# Patient Record
Sex: FEMALE | Race: BLACK OR AFRICAN AMERICAN | NOT HISPANIC OR LATINO | Employment: OTHER | URBAN - METROPOLITAN AREA
[De-identification: names, ages, dates, MRNs, and addresses within clinical notes are randomized per-mention and may not be internally consistent; named-entity substitution may affect disease eponyms.]

---

## 2018-01-30 ENCOUNTER — OFFICE VISIT (OUTPATIENT)
Dept: OBSTETRICS AND GYNECOLOGY | Facility: CLINIC | Age: 38
End: 2018-01-30
Payer: MEDICARE

## 2018-01-30 VITALS
DIASTOLIC BLOOD PRESSURE: 94 MMHG | BODY MASS INDEX: 38.44 KG/M2 | SYSTOLIC BLOOD PRESSURE: 154 MMHG | WEIGHT: 244.94 LBS | HEART RATE: 55 BPM | HEIGHT: 67 IN

## 2018-01-30 DIAGNOSIS — N94.89 OTHER SPECIFIED CONDITIONS ASSOCIATED WITH FEMALE GENITAL ORGANS AND MENSTRUAL CYCLE: ICD-10-CM

## 2018-01-30 DIAGNOSIS — N89.8 VAGINAL DISCHARGE: Primary | ICD-10-CM

## 2018-01-30 DIAGNOSIS — N89.8 VAGINAL ITCHING: ICD-10-CM

## 2018-01-30 DIAGNOSIS — Z11.3 ENCOUNTER FOR SCREENING EXAMINATION FOR SEXUALLY TRANSMITTED DISEASE: ICD-10-CM

## 2018-01-30 PROCEDURE — 99203 OFFICE O/P NEW LOW 30 MIN: CPT | Mod: S$PBB,,, | Performed by: NURSE PRACTITIONER

## 2018-01-30 PROCEDURE — 99203 OFFICE O/P NEW LOW 30 MIN: CPT | Mod: PBBFAC,PO | Performed by: NURSE PRACTITIONER

## 2018-01-30 PROCEDURE — 99999 PR PBB SHADOW E&M-NEW PATIENT-LVL III: CPT | Mod: PBBFAC,,, | Performed by: NURSE PRACTITIONER

## 2018-01-30 PROCEDURE — 87480 CANDIDA DNA DIR PROBE: CPT

## 2018-01-30 PROCEDURE — 87491 CHLMYD TRACH DNA AMP PROBE: CPT

## 2018-01-30 RX ORDER — ENALAPRIL MALEATE 20 MG/1
20 TABLET ORAL DAILY
COMMUNITY
End: 2022-10-10 | Stop reason: DRUGHIGH

## 2018-01-30 NOTE — PROGRESS NOTES
"Chief Complaint   Patient presents with    Vaginal Discharge       History of Present Illness: Quiana Villanueva is a 37 y.o. female that presents today 2018 for   Pt here today c/o thick white vaginal discharge and vaginal itching. She denies any vaginal burning or odor at this time. She reports that the symptoms started about a week ago. She does use scented soaps in the genital area. No other complaints or concerns noted.        Chief Complaint   Patient presents with    Vaginal Discharge         Past Medical History:   Diagnosis Date    Abnormal Pap smear of cervix     Cryosurgery    Hypertension     Pseudotumor cerebri        Past Surgical History:   Procedure Laterality Date    CHOLECYSTECTOMY      GYNECOLOGIC CRYOSURGERY         Current Outpatient Prescriptions   Medication Sig Dispense Refill    enalapril (VASOTEC) 20 MG tablet Take 20 mg by mouth once daily.       No current facility-administered medications for this visit.        Review of patient's allergies indicates:  No Known Allergies    Family History   Problem Relation Age of Onset    Colon cancer Father     Hypertension Mother     Heart disease Mother        Social History   Substance Use Topics    Smoking status: Never Smoker    Smokeless tobacco: Never Used    Alcohol use No       OB History    Para Term  AB Living   4 4 4     4   SAB TAB Ectopic Multiple Live Births           4      # Outcome Date GA Lbr Feliberto/2nd Weight Sex Delivery Anes PTL Lv   4 Term      Vag-Spont   LANDON   3 Term      Vag-Spont   LANDON   2 Term      Vag-Spont   LANDON   1 Term      Vag-Spont   LANDON          Review of Symptoms:  GENERAL: Denies weight gain or weight loss. Feeling well overall.   SKIN: Denies rash or lesions.   ABDOMEN: No abdominal pain, constipation, diarrhea, nausea, vomiting or rectal bleeding.   URINARY: No frequency, dysuria, hematuria, or burning on urination.    BP (!) 154/94   Pulse (!) 55   Ht 5' 7" (1.702 m)   Wt " 111.1 kg (244 lb 14.9 oz)   LMP 01/25/2018 (Exact Date)   Physical Exam:  APPEARANCE: Well nourished, well developed, in no acute distress.  SKIN: Normal skin turgor, no lesions.  RESPIRATORY: Normal respiratory effort with no retractions or use of accessory muscles  PELVIC: Normal external female genitalia without lesions. Normal hair distribution. Vagina moist and well rugated without lesions or discharge. Cervix pink and without lesions.    ASSESSMENT/PLAN:  Vaginal discharge  -     Vaginosis Screen by DNA Probe  -     C. trachomatis/N. gonorrhoeae by AMP DNA Cervix    Encounter for screening examination for sexually transmitted disease  -     Vaginosis Screen by DNA Probe  -     C. trachomatis/N. gonorrhoeae by AMP DNA Cervix    Vaginal itching  -     Vaginosis Screen by DNA Probe  -     C. trachomatis/N. gonorrhoeae by AMP DNA Cervix    Other specified conditions associated with female genital organs and menstrual cycle   -     C. trachomatis/N. gonorrhoeae by AMP DNA Cervix      -Discussed with pt the importance of avoiding scented products in the vaginal area such as soaps, body washes, bubble baths, bath bombs, scented pads/tampons, douches, etc.. Also wear cotton underwear and change out of wet/sweaty clothing as soon as possible. Pt verbalized understanding.     Follow-up:  Will follow-up once results are received  RTC if symptoms worsen or do not improve  RTC as needed

## 2018-01-31 ENCOUNTER — TELEPHONE (OUTPATIENT)
Dept: OBSTETRICS AND GYNECOLOGY | Facility: CLINIC | Age: 38
End: 2018-01-31

## 2018-01-31 LAB
CANDIDA RRNA VAG QL PROBE: NEGATIVE
G VAGINALIS RRNA GENITAL QL PROBE: POSITIVE
T VAGINALIS RRNA GENITAL QL PROBE: NEGATIVE

## 2018-01-31 RX ORDER — METRONIDAZOLE 500 MG/1
500 TABLET ORAL 2 TIMES DAILY
Qty: 14 TABLET | Refills: 0 | Status: SHIPPED | OUTPATIENT
Start: 2018-01-31 | End: 2018-02-07

## 2018-01-31 NOTE — TELEPHONE ENCOUNTER
Please let patient know her vaginosis culture came back positive for bacterial vaginosis- which is not a STD. It is a bacteria that some times over grows in the vagina and replaces normal vaginal claritza. I sent in a prescription for flagyl. Avoid alcohol while taking medication and for 24 hours after last dose.     Thanks

## 2018-02-01 ENCOUNTER — TELEPHONE (OUTPATIENT)
Dept: OBSTETRICS AND GYNECOLOGY | Facility: CLINIC | Age: 38
End: 2018-02-01

## 2018-02-01 LAB
C TRACH DNA SPEC QL NAA+PROBE: NOT DETECTED
N GONORRHOEA DNA SPEC QL NAA+PROBE: NOT DETECTED

## 2019-09-06 ENCOUNTER — HOSPITAL ENCOUNTER (EMERGENCY)
Facility: HOSPITAL | Age: 39
Discharge: HOME | End: 2019-09-06
Attending: EMERGENCY MEDICINE
Payer: MEDICARE

## 2019-09-06 VITALS
DIASTOLIC BLOOD PRESSURE: 87 MMHG | HEIGHT: 67 IN | TEMPERATURE: 98 F | HEART RATE: 46 BPM | OXYGEN SATURATION: 100 % | BODY MASS INDEX: 36.41 KG/M2 | RESPIRATION RATE: 16 BRPM | WEIGHT: 232 LBS | SYSTOLIC BLOOD PRESSURE: 176 MMHG

## 2019-09-06 DIAGNOSIS — R07.9 CHEST PAIN: ICD-10-CM

## 2019-09-06 DIAGNOSIS — M54.9 MUSCULOSKELETAL BACK PAIN: Primary | ICD-10-CM

## 2019-09-06 LAB
ALBUMIN SERPL BCP-MCNC: 3.9 G/DL (ref 3.5–5.2)
ALP SERPL-CCNC: 51 U/L (ref 55–135)
ALT SERPL W/O P-5'-P-CCNC: 10 U/L (ref 10–44)
ANION GAP SERPL CALC-SCNC: 8 MMOL/L (ref 8–16)
AST SERPL-CCNC: 16 U/L (ref 10–40)
B-HCG UR QL: NEGATIVE
BASOPHILS # BLD AUTO: 0.05 K/UL (ref 0–0.2)
BASOPHILS NFR BLD: 1 % (ref 0–1.9)
BILIRUB SERPL-MCNC: 1.1 MG/DL (ref 0.1–1)
BNP SERPL-MCNC: 30 PG/ML (ref 0–99)
BUN SERPL-MCNC: 14 MG/DL (ref 6–20)
CALCIUM SERPL-MCNC: 8.9 MG/DL (ref 8.7–10.5)
CHLORIDE SERPL-SCNC: 105 MMOL/L (ref 95–110)
CO2 SERPL-SCNC: 24 MMOL/L (ref 23–29)
CREAT SERPL-MCNC: 0.7 MG/DL (ref 0.5–1.4)
CTP QC/QA: YES
D DIMER PPP IA.FEU-MCNC: <0.27 UG/ML FEU
DIFFERENTIAL METHOD: NORMAL
EOSINOPHIL # BLD AUTO: 0.1 K/UL (ref 0–0.5)
EOSINOPHIL NFR BLD: 1 % (ref 0–8)
ERYTHROCYTE [DISTWIDTH] IN BLOOD BY AUTOMATED COUNT: 13.2 % (ref 11.5–14.5)
EST. GFR  (AFRICAN AMERICAN): >60 ML/MIN/1.73 M^2
EST. GFR  (NON AFRICAN AMERICAN): >60 ML/MIN/1.73 M^2
GLUCOSE SERPL-MCNC: 95 MG/DL (ref 70–110)
HCT VFR BLD AUTO: 39.2 % (ref 37–48.5)
HGB BLD-MCNC: 12.8 G/DL (ref 12–16)
IMM GRANULOCYTES # BLD AUTO: 0 K/UL (ref 0–0.04)
IMM GRANULOCYTES NFR BLD AUTO: 0 % (ref 0–0.5)
LYMPHOCYTES # BLD AUTO: 2.3 K/UL (ref 1–4.8)
LYMPHOCYTES NFR BLD: 43.4 % (ref 18–48)
MCH RBC QN AUTO: 28 PG (ref 27–31)
MCHC RBC AUTO-ENTMCNC: 32.7 G/DL (ref 32–36)
MCV RBC AUTO: 86 FL (ref 82–98)
MONOCYTES # BLD AUTO: 0.4 K/UL (ref 0.3–1)
MONOCYTES NFR BLD: 8.1 % (ref 4–15)
NEUTROPHILS # BLD AUTO: 2.4 K/UL (ref 1.8–7.7)
NEUTROPHILS NFR BLD: 46.5 % (ref 38–73)
NRBC BLD-RTO: 0 /100 WBC
PLATELET # BLD AUTO: 291 K/UL (ref 150–350)
PMV BLD AUTO: 9.7 FL (ref 9.2–12.9)
POTASSIUM SERPL-SCNC: 3.6 MMOL/L (ref 3.5–5.1)
PROT SERPL-MCNC: 7.4 G/DL (ref 6–8.4)
RBC # BLD AUTO: 4.57 M/UL (ref 4–5.4)
SODIUM SERPL-SCNC: 137 MMOL/L (ref 136–145)
TROPONIN I SERPL DL<=0.01 NG/ML-MCNC: <0.03 NG/ML (ref 0.02–0.04)
WBC # BLD AUTO: 5.19 K/UL (ref 3.9–12.7)

## 2019-09-06 PROCEDURE — 85379 FIBRIN DEGRADATION QUANT: CPT

## 2019-09-06 PROCEDURE — 80053 COMPREHEN METABOLIC PANEL: CPT

## 2019-09-06 PROCEDURE — 81025 URINE PREGNANCY TEST: CPT | Performed by: EMERGENCY MEDICINE

## 2019-09-06 PROCEDURE — 84484 ASSAY OF TROPONIN QUANT: CPT

## 2019-09-06 PROCEDURE — 99285 EMERGENCY DEPT VISIT HI MDM: CPT | Mod: 25

## 2019-09-06 PROCEDURE — 93005 ELECTROCARDIOGRAM TRACING: CPT

## 2019-09-06 PROCEDURE — 83880 ASSAY OF NATRIURETIC PEPTIDE: CPT

## 2019-09-06 PROCEDURE — 25000003 PHARM REV CODE 250: Performed by: EMERGENCY MEDICINE

## 2019-09-06 PROCEDURE — 85025 COMPLETE CBC W/AUTO DIFF WBC: CPT

## 2019-09-06 RX ORDER — METHOCARBAMOL 750 MG/1
750 TABLET, FILM COATED ORAL 3 TIMES DAILY
Qty: 10 TABLET | Refills: 0 | Status: SHIPPED | OUTPATIENT
Start: 2019-09-06 | End: 2019-09-11

## 2019-09-06 RX ORDER — METHOCARBAMOL 500 MG/1
500 TABLET, FILM COATED ORAL
Status: COMPLETED | OUTPATIENT
Start: 2019-09-06 | End: 2019-09-06

## 2019-09-06 RX ADMIN — METHOCARBAMOL 500 MG: 500 TABLET, FILM COATED ORAL at 02:09

## 2019-09-06 NOTE — ED PROVIDER NOTES
Encounter Date: 9/6/2019       History 38-year-old female presents emergency department with complaint of pain to the right scapular region of her back that started this morning while taking a bath patient reports pain increases with breathing, or moving of her torso.     Chief Complaint   Patient presents with    Back Pain     Upper Right     HPI  Review of patient's allergies indicates:  No Known Allergies  Past Medical History:   Diagnosis Date    Abnormal Pap smear of cervix     Cryosurgery    Hypertension     Pseudotumor cerebri      Past Surgical History:   Procedure Laterality Date    CHOLECYSTECTOMY      GYNECOLOGIC CRYOSURGERY       Family History   Problem Relation Age of Onset    Colon cancer Father     Hypertension Mother     Heart disease Mother      Social History     Tobacco Use    Smoking status: Never Smoker    Smokeless tobacco: Never Used   Substance Use Topics    Alcohol use: No    Drug use: No     Review of Systems   Constitutional: Negative.    HENT: Negative.    Eyes: Negative.    Respiratory: Negative.    Cardiovascular: Negative.    Gastrointestinal: Negative.    Genitourinary: Negative.    Musculoskeletal: Negative.    Neurological: Negative.    Hematological: Negative.    Psychiatric/Behavioral: Negative.    All other systems reviewed and are negative.      Physical Exam     Initial Vitals [09/06/19 0950]   BP Pulse Resp Temp SpO2   (!) 170/81 (!) 56 16 98.7 °F (37.1 °C) 100 %      MAP       --         Physical Exam    Nursing note and vitals reviewed.  Constitutional: She appears well-developed and well-nourished.   HENT:   Head: Normocephalic and atraumatic.   Eyes: EOM are normal. Pupils are equal, round, and reactive to light.   Neck: Normal range of motion. Neck supple.   Cardiovascular: Normal rate, regular rhythm, normal heart sounds and intact distal pulses.   Pulmonary/Chest: Breath sounds normal.   Abdominal: Soft. Bowel sounds are normal.   Musculoskeletal:         Right shoulder: She exhibits pain.        Arms:  Patient has pain to the right scapular region that is reproducible with movement of her torso or breathing.   Neurological: She is alert and oriented to person, place, and time. She has normal strength and normal reflexes.   Skin: Skin is warm and dry.   Psychiatric: She has a normal mood and affect.         ED Course   Procedures  Labs Reviewed   CBC W/ AUTO DIFFERENTIAL   COMPREHENSIVE METABOLIC PANEL   TROPONIN I   B-TYPE NATRIURETIC PEPTIDE   D DIMER, QUANTITATIVE     EKG Readings: (Independently Interpreted)   Initial Reading: No STEMI. Rhythm: Sinus Bradycardia. Clinical Impression: with RBBB   NO changes from previous EKG reviewed by DR luis on [facility]   ECG Results          EKG 12-lead (In process)  Result time 09/06/19 10:48:08    In process by Interface, Lab In [facility] (09/06/19 10:48:08)                 Narrative:    Test Reason : R07.9,    Vent. Rate : 049 BPM     Atrial Rate : 049 BPM     P-R Int : 148 ms          QRS Dur : 106 ms      QT Int : 432 ms       P-R-T Axes : 038 000 023 degrees     QTc Int : 390 ms    Sinus bradycardia  Incomplete right bundle branch block  Borderline Abnormal ECG  No previous ECGs available    Referred By: [email or web address]   SELF           Confirmed By:                             Imaging Results    None          Medical Decision Making:   Initial Assessment:   Back pain   Differential Diagnosis:   Musculoskeletal pain, nonspecific chest pain, muscle strain, pulmonary embolism, pneumonia  ED Management:  Two 38-year-old female presents to the emergency room with complaint of nontraumatic pain to the right scapular region that she noted when she was taking a bath and shaving her legs.  Patient states the pain at is felt to most when she lays back on her back or when she moves her torso.  She states sometimes the pain is present when she breathes she has no pain when she is just resting comfortably.  Patient denies associated  shortness of breath or chest pain she does have a history of hypertension and is not compliant with her medications today.  Patient's EKG is essentially unchanged from previous EKG that was noted in a cast and troponin is negative labs are normal D-dimer is negative and patient has no risk factors for pulmonary embolism that would warrant further imaging including CTA at this time.  The patient was given detailed return precautions she will treated at this time for musculoskeletal pain, as the patient's pain is reproducible.         The patient was given detailed return precautions based on my clinical evaluation I do not appreciate any immediate emergent or life-threatening condition or etiology that warrant additional workup today and I feel that the patient could be discharged with close follow-up care           ED Course as of Sep 06 1404   Fri Sep 06, 2019   1355 POCT urine pregnancy [MP]      ED Course User Index  [MP] SHAHBAZ Hughes     Clinical Impression:       ICD-10-CM ICD-9-CM   1. Musculoskeletal back pain M54.9 724.5   2. Chest pain R07.9 786.50                                SHAHBAZ Hughes  09/06/19 1417

## 2019-09-06 NOTE — ED TRIAGE NOTES
Pt states while taking a bath she began having upper right back pain. She says its a burning pain only when she moves. Pain is 7/10.

## 2019-09-06 NOTE — DISCHARGE INSTRUCTIONS
Please follow-up with your primary care provider as directed further evaluation and definitive care  Robaxin as directed if needed for muscle spasms  Return if condition becomes worse he developed chest pain, increasing pain, shortness of breath or any concerns

## 2020-01-17 ENCOUNTER — HOSPITAL ENCOUNTER (EMERGENCY)
Facility: HOSPITAL | Age: 40
Discharge: HOME | End: 2020-01-17
Attending: EMERGENCY MEDICINE
Payer: MEDICARE

## 2020-01-17 VITALS
WEIGHT: 234 LBS | RESPIRATION RATE: 14 BRPM | SYSTOLIC BLOOD PRESSURE: 133 MMHG | DIASTOLIC BLOOD PRESSURE: 74 MMHG | HEIGHT: 67 IN | BODY MASS INDEX: 36.73 KG/M2 | OXYGEN SATURATION: 99 % | TEMPERATURE: 99 F | HEART RATE: 52 BPM

## 2020-01-17 DIAGNOSIS — I10 HYPERTENSION, UNSPECIFIED TYPE: Primary | ICD-10-CM

## 2020-01-17 DIAGNOSIS — I10 HTN (HYPERTENSION): ICD-10-CM

## 2020-01-17 LAB
ALBUMIN SERPL BCP-MCNC: 4.1 G/DL (ref 3.5–5.2)
ALP SERPL-CCNC: 54 U/L (ref 55–135)
ALT SERPL W/O P-5'-P-CCNC: 11 U/L (ref 10–44)
ANION GAP SERPL CALC-SCNC: 9 MMOL/L (ref 8–16)
AST SERPL-CCNC: 18 U/L (ref 10–40)
B-HCG UR QL: NEGATIVE
BASOPHILS # BLD AUTO: 0.05 K/UL (ref 0–0.2)
BASOPHILS NFR BLD: 0.9 % (ref 0–1.9)
BILIRUB SERPL-MCNC: 1.7 MG/DL (ref 0.1–1)
BILIRUB UR QL STRIP: NEGATIVE
BNP SERPL-MCNC: 10 PG/ML (ref 0–99)
BUN SERPL-MCNC: 10 MG/DL (ref 6–20)
CALCIUM SERPL-MCNC: 9.6 MG/DL (ref 8.7–10.5)
CHLORIDE SERPL-SCNC: 106 MMOL/L (ref 95–110)
CLARITY UR: CLEAR
CO2 SERPL-SCNC: 27 MMOL/L (ref 23–29)
COLOR UR: YELLOW
CREAT SERPL-MCNC: 0.8 MG/DL (ref 0.5–1.4)
CTP QC/QA: YES
DIFFERENTIAL METHOD: NORMAL
EOSINOPHIL # BLD AUTO: 0 K/UL (ref 0–0.5)
EOSINOPHIL NFR BLD: 0.7 % (ref 0–8)
ERYTHROCYTE [DISTWIDTH] IN BLOOD BY AUTOMATED COUNT: 12.6 % (ref 11.5–14.5)
EST. GFR  (AFRICAN AMERICAN): >60 ML/MIN/1.73 M^2
EST. GFR  (NON AFRICAN AMERICAN): >60 ML/MIN/1.73 M^2
GLUCOSE SERPL-MCNC: 109 MG/DL (ref 70–110)
GLUCOSE UR QL STRIP: NEGATIVE
HCT VFR BLD AUTO: 40.1 % (ref 37–48.5)
HGB BLD-MCNC: 13.7 G/DL (ref 12–16)
HGB UR QL STRIP: ABNORMAL
IMM GRANULOCYTES # BLD AUTO: 0.01 K/UL (ref 0–0.04)
IMM GRANULOCYTES NFR BLD AUTO: 0.2 % (ref 0–0.5)
INR PPP: 1.1
KETONES UR QL STRIP: NEGATIVE
LEUKOCYTE ESTERASE UR QL STRIP: NEGATIVE
LYMPHOCYTES # BLD AUTO: 2.5 K/UL (ref 1–4.8)
LYMPHOCYTES NFR BLD: 43.5 % (ref 18–48)
MCH RBC QN AUTO: 28.8 PG (ref 27–31)
MCHC RBC AUTO-ENTMCNC: 34.2 G/DL (ref 32–36)
MCV RBC AUTO: 84 FL (ref 82–98)
MONOCYTES # BLD AUTO: 0.4 K/UL (ref 0.3–1)
MONOCYTES NFR BLD: 6.9 % (ref 4–15)
NEUTROPHILS # BLD AUTO: 2.8 K/UL (ref 1.8–7.7)
NEUTROPHILS NFR BLD: 47.8 % (ref 38–73)
NITRITE UR QL STRIP: NEGATIVE
NRBC BLD-RTO: 0 /100 WBC
PH UR STRIP: 6 [PH] (ref 5–8)
PLATELET # BLD AUTO: 299 K/UL (ref 150–350)
PMV BLD AUTO: 9.4 FL (ref 9.2–12.9)
POTASSIUM SERPL-SCNC: 3.7 MMOL/L (ref 3.5–5.1)
PROT SERPL-MCNC: 7.9 G/DL (ref 6–8.4)
PROT UR QL STRIP: NEGATIVE
PROTHROMBIN TIME: 14 SEC (ref 10.6–14.8)
RBC # BLD AUTO: 4.76 M/UL (ref 4–5.4)
SODIUM SERPL-SCNC: 142 MMOL/L (ref 136–145)
SP GR UR STRIP: 1.01 (ref 1–1.03)
TROPONIN I SERPL DL<=0.01 NG/ML-MCNC: <0.03 NG/ML
URN SPEC COLLECT METH UR: ABNORMAL
UROBILINOGEN UR STRIP-ACNC: NEGATIVE EU/DL
WBC # BLD AUTO: 5.79 K/UL (ref 3.9–12.7)

## 2020-01-17 PROCEDURE — 81003 URINALYSIS AUTO W/O SCOPE: CPT

## 2020-01-17 PROCEDURE — 85025 COMPLETE CBC W/AUTO DIFF WBC: CPT

## 2020-01-17 PROCEDURE — 99285 EMERGENCY DEPT VISIT HI MDM: CPT | Mod: 25

## 2020-01-17 PROCEDURE — 83880 ASSAY OF NATRIURETIC PEPTIDE: CPT

## 2020-01-17 PROCEDURE — 81025 URINE PREGNANCY TEST: CPT | Performed by: NURSE PRACTITIONER

## 2020-01-17 PROCEDURE — 85610 PROTHROMBIN TIME: CPT

## 2020-01-17 PROCEDURE — 80053 COMPREHEN METABOLIC PANEL: CPT

## 2020-01-17 PROCEDURE — 93005 ELECTROCARDIOGRAM TRACING: CPT

## 2020-01-17 PROCEDURE — 36415 COLL VENOUS BLD VENIPUNCTURE: CPT

## 2020-01-17 PROCEDURE — 84484 ASSAY OF TROPONIN QUANT: CPT

## 2020-01-17 RX ORDER — HYDROCHLOROTHIAZIDE 25 MG/1
25 TABLET ORAL DAILY
COMMUNITY

## 2020-01-17 RX ORDER — AMLODIPINE BESYLATE 10 MG/1
10 TABLET ORAL DAILY
COMMUNITY

## 2020-01-17 RX ORDER — CLONIDINE HYDROCHLORIDE 0.1 MG/1
0.1 TABLET ORAL EVERY 6 HOURS PRN
Qty: 30 TABLET | Refills: 0 | Status: SHIPPED | OUTPATIENT
Start: 2020-01-17 | End: 2021-01-16

## 2020-01-17 NOTE — DISCHARGE INSTRUCTIONS
Low-sodium diet.  Take clonidine 0.1 mg for systolic blood pressure greater than 160.  Otherwise continue all routine maintenance medications and contact Dr. Renee's office for follow-up appointment.  Low-sodium diet

## 2020-07-02 ENCOUNTER — HOSPITAL ENCOUNTER (OUTPATIENT)
Dept: RADIOLOGY | Facility: HOSPITAL | Age: 40
Discharge: HOME | End: 2020-07-02
Attending: EMERGENCY MEDICINE
Payer: MEDICARE

## 2020-07-02 DIAGNOSIS — R06.02 SHORTNESS OF BREATH: ICD-10-CM

## 2020-07-02 PROCEDURE — 71046 X-RAY EXAM CHEST 2 VIEWS: CPT | Mod: TC,PO

## 2020-07-10 ENCOUNTER — HOSPITAL ENCOUNTER (EMERGENCY)
Facility: HOSPITAL | Age: 40
Discharge: HOME | End: 2020-07-10
Attending: EMERGENCY MEDICINE
Payer: MEDICARE

## 2020-07-10 VITALS
BODY MASS INDEX: 36.1 KG/M2 | RESPIRATION RATE: 20 BRPM | OXYGEN SATURATION: 100 % | DIASTOLIC BLOOD PRESSURE: 75 MMHG | WEIGHT: 230 LBS | HEIGHT: 67 IN | SYSTOLIC BLOOD PRESSURE: 121 MMHG | TEMPERATURE: 99 F | HEART RATE: 71 BPM

## 2020-07-10 DIAGNOSIS — E83.42 HYPOMAGNESEMIA: ICD-10-CM

## 2020-07-10 DIAGNOSIS — R20.0 NUMBNESS: Primary | ICD-10-CM

## 2020-07-10 DIAGNOSIS — E87.6 HYPOKALEMIA: ICD-10-CM

## 2020-07-10 LAB
ALBUMIN SERPL BCP-MCNC: 3.6 G/DL (ref 3.5–5.2)
ALP SERPL-CCNC: 54 U/L (ref 55–135)
ALT SERPL W/O P-5'-P-CCNC: 12 U/L (ref 10–44)
ANION GAP SERPL CALC-SCNC: 9 MMOL/L (ref 8–16)
AST SERPL-CCNC: 17 U/L (ref 10–40)
B-HCG UR QL: NEGATIVE
BASOPHILS # BLD AUTO: 0.04 K/UL (ref 0–0.2)
BASOPHILS NFR BLD: 0.7 % (ref 0–1.9)
BILIRUB SERPL-MCNC: 0.5 MG/DL (ref 0.1–1)
BUN SERPL-MCNC: 16 MG/DL (ref 6–20)
CALCIUM SERPL-MCNC: 9 MG/DL (ref 8.7–10.5)
CHLORIDE SERPL-SCNC: 103 MMOL/L (ref 95–110)
CO2 SERPL-SCNC: 27 MMOL/L (ref 23–29)
CREAT SERPL-MCNC: 0.7 MG/DL (ref 0.5–1.4)
CTP QC/QA: YES
DIFFERENTIAL METHOD: ABNORMAL
EOSINOPHIL # BLD AUTO: 0.1 K/UL (ref 0–0.5)
EOSINOPHIL NFR BLD: 1 % (ref 0–8)
ERYTHROCYTE [DISTWIDTH] IN BLOOD BY AUTOMATED COUNT: 13 % (ref 11.5–14.5)
EST. GFR  (AFRICAN AMERICAN): >60 ML/MIN/1.73 M^2
EST. GFR  (NON AFRICAN AMERICAN): >60 ML/MIN/1.73 M^2
GLUCOSE SERPL-MCNC: 149 MG/DL (ref 70–110)
HCT VFR BLD AUTO: 33.8 % (ref 37–48.5)
HGB BLD-MCNC: 11.5 G/DL (ref 12–16)
IMM GRANULOCYTES # BLD AUTO: 0.01 K/UL (ref 0–0.04)
IMM GRANULOCYTES NFR BLD AUTO: 0.2 % (ref 0–0.5)
INR PPP: 1
LYMPHOCYTES # BLD AUTO: 2.4 K/UL (ref 1–4.8)
LYMPHOCYTES NFR BLD: 42.1 % (ref 18–48)
MAGNESIUM SERPL-MCNC: 1.5 MG/DL (ref 1.6–2.6)
MCH RBC QN AUTO: 28.3 PG (ref 27–31)
MCHC RBC AUTO-ENTMCNC: 34 G/DL (ref 32–36)
MCV RBC AUTO: 83 FL (ref 82–98)
MONOCYTES # BLD AUTO: 0.5 K/UL (ref 0.3–1)
MONOCYTES NFR BLD: 9.3 % (ref 4–15)
NEUTROPHILS # BLD AUTO: 2.7 K/UL (ref 1.8–7.7)
NEUTROPHILS NFR BLD: 46.7 % (ref 38–73)
NRBC BLD-RTO: 0 /100 WBC
PLATELET # BLD AUTO: 264 K/UL (ref 150–350)
PMV BLD AUTO: 9.8 FL (ref 9.2–12.9)
POTASSIUM SERPL-SCNC: 3 MMOL/L (ref 3.5–5.1)
PROT SERPL-MCNC: 6.7 G/DL (ref 6–8.4)
PROTHROMBIN TIME: 13.1 SEC (ref 10.6–14.8)
RBC # BLD AUTO: 4.06 M/UL (ref 4–5.4)
SODIUM SERPL-SCNC: 139 MMOL/L (ref 136–145)
TROPONIN I SERPL DL<=0.01 NG/ML-MCNC: <0.03 NG/ML
TSH SERPL DL<=0.005 MIU/L-ACNC: 1.27 UIU/ML (ref 0.34–5.6)
WBC # BLD AUTO: 5.8 K/UL (ref 3.9–12.7)

## 2020-07-10 PROCEDURE — 25000003 PHARM REV CODE 250: Performed by: EMERGENCY MEDICINE

## 2020-07-10 PROCEDURE — 93005 ELECTROCARDIOGRAM TRACING: CPT | Performed by: INTERNAL MEDICINE

## 2020-07-10 PROCEDURE — 83735 ASSAY OF MAGNESIUM: CPT

## 2020-07-10 PROCEDURE — 25500020 PHARM REV CODE 255: Performed by: EMERGENCY MEDICINE

## 2020-07-10 PROCEDURE — 80053 COMPREHEN METABOLIC PANEL: CPT

## 2020-07-10 PROCEDURE — 99285 EMERGENCY DEPT VISIT HI MDM: CPT | Mod: 25

## 2020-07-10 PROCEDURE — 96365 THER/PROPH/DIAG IV INF INIT: CPT

## 2020-07-10 PROCEDURE — 85610 PROTHROMBIN TIME: CPT

## 2020-07-10 PROCEDURE — 63600175 PHARM REV CODE 636 W HCPCS: Performed by: EMERGENCY MEDICINE

## 2020-07-10 PROCEDURE — 84484 ASSAY OF TROPONIN QUANT: CPT

## 2020-07-10 PROCEDURE — 84443 ASSAY THYROID STIM HORMONE: CPT

## 2020-07-10 PROCEDURE — 36415 COLL VENOUS BLD VENIPUNCTURE: CPT

## 2020-07-10 PROCEDURE — 85025 COMPLETE CBC W/AUTO DIFF WBC: CPT

## 2020-07-10 PROCEDURE — 81025 URINE PREGNANCY TEST: CPT | Performed by: EMERGENCY MEDICINE

## 2020-07-10 RX ORDER — MAGNESIUM SULFATE HEPTAHYDRATE 40 MG/ML
2 INJECTION, SOLUTION INTRAVENOUS ONCE
Status: COMPLETED | OUTPATIENT
Start: 2020-07-10 | End: 2020-07-10

## 2020-07-10 RX ORDER — POTASSIUM CHLORIDE 20 MEQ/1
40 TABLET, EXTENDED RELEASE ORAL ONCE
Status: COMPLETED | OUTPATIENT
Start: 2020-07-10 | End: 2020-07-10

## 2020-07-10 RX ADMIN — IOHEXOL 100 ML: 350 INJECTION, SOLUTION INTRAVENOUS at 07:07

## 2020-07-10 RX ADMIN — POTASSIUM CHLORIDE 40 MEQ: 20 TABLET, EXTENDED RELEASE ORAL at 07:07

## 2020-07-10 RX ADMIN — MAGNESIUM SULFATE IN WATER 2 G: 40 INJECTION, SOLUTION INTRAVENOUS at 07:07

## 2020-07-11 NOTE — DISCHARGE INSTRUCTIONS
Follow-up ultrasound of the thyroid is indicated at is it is enlarged on CT scan.  Follow this up with your primary care doctor.  Return to the ER if any worsening numbness tingling weakness or any concern.

## 2020-07-11 NOTE — ED NOTES
At 2115- Discharge instructions, diagnosis, medications, and follow up discussed with patient. Patient verbalized understanding. All questions and concerns answered. No needs expressed at the time. Pt is awake, alert and oriented with no acute distress noted. Respirations even and unlabored. Ambulatory out of ED with mask in place.

## 2020-07-11 NOTE — ED PROVIDER NOTES
Encounter Date: 7/10/2020       History     Chief Complaint   Patient presents with    Numbness     both arms/hands happened last Thursday and again at 4 PM today     Patient presents complaining of tingling and numbness to both hands, right thigh tingling and numbness, palpitations, tingling and numbness around the lips.  Symptoms have been present since 11:00 a.m..  Patient states she had a similar episode about 1 week ago.  At that time went to another hospital had a negative head CT ultimately was discharged.  She denies any change in speech or weakness.        Review of patient's allergies indicates:  No Known Allergies  Past Medical History:   Diagnosis Date    Abnormal Pap smear of cervix     Cryosurgery    Hypertension     Pseudotumor cerebri      Past Surgical History:   Procedure Laterality Date    CHOLECYSTECTOMY      GYNECOLOGIC CRYOSURGERY       Family History   Problem Relation Age of Onset    Colon cancer Father     Hypertension Mother     Heart disease Mother      Social History     Tobacco Use    Smoking status: Never Smoker    Smokeless tobacco: Never Used   Substance Use Topics    Alcohol use: No    Drug use: No     Review of Systems   Neurological: Positive for numbness. Negative for speech difficulty and weakness.   All other systems reviewed and are negative.      Physical Exam     Initial Vitals [07/10/20 1657]   BP Pulse Resp Temp SpO2   (!) 144/79 85 20 98.5 °F (36.9 °C) 100 %      MAP       --         Physical Exam    Nursing note and vitals reviewed.  Constitutional: She appears well-developed and well-nourished.   HENT:   Head: Normocephalic and atraumatic.   Mouth/Throat: Oropharynx is clear and moist.   Eyes: EOM are normal.   Neck: Normal range of motion. Neck supple.   Cardiovascular: Normal rate, regular rhythm, normal heart sounds and intact distal pulses.   Pulmonary/Chest: Breath sounds normal. No respiratory distress.   Abdominal: Soft.   Musculoskeletal: Normal  range of motion.   Neurological: She is alert and oriented to person, place, and time. She has normal strength.   Vision - Normal  Aphasia - Normal  Neglect - Normal  Pronator drift - Normal  Cerebellum - Normal  RUE strength - Normal  RLE strength - Normal  LUE strength - Normal  LLE strength - Normal   Skin: Skin is warm and dry. Capillary refill takes less than 2 seconds.   Psychiatric: She has a normal mood and affect. Her behavior is normal. Judgment and thought content normal.         ED Course   Procedures  Labs Reviewed   COMPREHENSIVE METABOLIC PANEL - Abnormal; Notable for the following components:       Result Value    Potassium 3.0 (*)     Glucose 149 (*)     Alkaline Phosphatase 54 (*)     All other components within normal limits   MAGNESIUM - Abnormal; Notable for the following components:    Magnesium 1.5 (*)     All other components within normal limits   CBC W/ AUTO DIFFERENTIAL - Abnormal; Notable for the following components:    Hemoglobin 11.5 (*)     Hematocrit 33.8 (*)     All other components within normal limits   PROTIME-INR   TROPONIN I   POCT URINE PREGNANCY     EKG Readings: (Independently Interpreted)   EKG is normal sinus rhythm, no acute ST changes, regular rate       Imaging Results          CTA Head and Neck (xpd) (In process)                CT Head Without Contrast (In process)                X-ray Chest AP Portable (Final result)  Result time 07/10/20 17:24:17    Final result by Brett Higginbotham MD (07/10/20 17:24:17)                 Narrative:    Reason: numbness Numbness?(both arms/hands happened last Thursday and  again at 4 PM today)    FINDINGS:  Portable chest at 1750 compared with 7/2/2020 shows normal  cardiomediastinal silhouette.    Lungs are clear. Pulmonary vasculature is normal. No acute osseous  abnormality.    IMPRESSION:  Negative chest.    Electronically Signed by Brett Higginbotham M.D. on 7/10/2020 5:29 PM                               Medical Decision Making:   ED  Management:  I suspect patient's numbness and tingling or secondary to electrolyte abnormalities as her potassium and magnesium were both markedly low the emergency department.  Patient received an IV magnesium replacement as well as oral potassium in the ER.  I have low suspicion for stroke as patient's numbness is bilateral to both hands and she has no one-sided weakness or any focal deficits.  There is no change in speech.  Her neuro exam is normal.  I advised patient that she likely will need to supplement potassium in her diet daily and that she will need primary care follow-up.  I am awaiting imaging of her head and CTA in the abundance of caution.  I anticipate these are normal patient can be safely discharged.    I advised patient of findings of enlarged thyroid and need for ultrasound.  Also added on TSH which she will follow up with her primary care doctor.                                 Clinical Impression:       ICD-10-CM ICD-9-CM   1. Numbness  R20.0 782.0   2. Hypomagnesemia  E83.42 275.2   3. Hypokalemia  E87.6 276.8                                Emil Reyes MD  07/10/20 2130       Emil Reyes MD  07/10/20 2156

## 2020-07-11 NOTE — ED NOTES
At 1900- Report received from BAILEE Hernandez. Assume care of pt. Pt resting comfortably and independently repositioned in stretcher with bed locked in lowest position for safety. NAD noted at this time. Respirations even and unlabored and visible chest rise noted.   Pt instructed to call if assistance is needed. Pt updated on POC and verbalized understanding. Call light within reach. Family at bedside. No needs at this time. Will continue to monitor.

## 2020-09-11 ENCOUNTER — HOSPITAL ENCOUNTER (OUTPATIENT)
Dept: RADIOLOGY | Facility: HOSPITAL | Age: 40
Discharge: HOME | End: 2020-09-11
Attending: EMERGENCY MEDICINE
Payer: MEDICARE

## 2020-09-11 DIAGNOSIS — Z86.73 PERSONAL HISTORY OF TRANSIENT ISCHEMIC ATTACK (TIA), AND CEREBRAL INFARCTION WITHOUT RESIDUAL DEFICITS: ICD-10-CM

## 2020-09-11 DIAGNOSIS — Z86.73 PERSONAL HISTORY OF TRANSIENT ISCHEMIC ATTACK (TIA), AND CEREBRAL INFARCTION WITHOUT RESIDUAL DEFICITS: Primary | ICD-10-CM

## 2020-09-11 PROCEDURE — 70551 MRI BRAIN STEM W/O DYE: CPT | Mod: TC,PO

## 2021-05-10 ENCOUNTER — PATIENT MESSAGE (OUTPATIENT)
Dept: RESEARCH | Facility: HOSPITAL | Age: 41
End: 2021-05-10

## 2021-08-19 DIAGNOSIS — Z12.31 ENCOUNTER FOR SCREENING MAMMOGRAM FOR MALIGNANT NEOPLASM OF BREAST: Primary | ICD-10-CM

## 2021-08-27 ENCOUNTER — HOSPITAL ENCOUNTER (OUTPATIENT)
Dept: RADIOLOGY | Facility: HOSPITAL | Age: 41
Discharge: HOME | End: 2021-08-27
Attending: OBSTETRICS & GYNECOLOGY
Payer: MEDICARE

## 2021-08-27 DIAGNOSIS — Z12.31 ENCOUNTER FOR SCREENING MAMMOGRAM FOR MALIGNANT NEOPLASM OF BREAST: ICD-10-CM

## 2021-08-27 PROCEDURE — 77067 SCR MAMMO BI INCL CAD: CPT | Mod: TC,PO

## 2021-09-24 ENCOUNTER — HOSPITAL ENCOUNTER (EMERGENCY)
Facility: HOSPITAL | Age: 41
Discharge: HOME | End: 2021-09-24
Attending: EMERGENCY MEDICINE
Payer: MEDICARE

## 2021-09-24 VITALS
WEIGHT: 243 LBS | RESPIRATION RATE: 18 BRPM | SYSTOLIC BLOOD PRESSURE: 162 MMHG | DIASTOLIC BLOOD PRESSURE: 76 MMHG | OXYGEN SATURATION: 99 % | HEART RATE: 50 BPM | HEIGHT: 67 IN | TEMPERATURE: 98 F | BODY MASS INDEX: 38.14 KG/M2

## 2021-09-24 DIAGNOSIS — R51.9 NONINTRACTABLE HEADACHE, UNSPECIFIED CHRONICITY PATTERN, UNSPECIFIED HEADACHE TYPE: Primary | ICD-10-CM

## 2021-09-24 DIAGNOSIS — I10 HYPERTENSION, UNSPECIFIED TYPE: ICD-10-CM

## 2021-09-24 LAB
ALBUMIN SERPL BCP-MCNC: 3.7 G/DL (ref 3.5–5.2)
ALP SERPL-CCNC: 52 U/L (ref 55–135)
ALT SERPL W/O P-5'-P-CCNC: 10 U/L (ref 10–44)
ANION GAP SERPL CALC-SCNC: 11 MMOL/L (ref 8–16)
AST SERPL-CCNC: 17 U/L (ref 10–40)
BASOPHILS # BLD AUTO: 0.03 K/UL (ref 0–0.2)
BASOPHILS NFR BLD: 0.5 % (ref 0–1.9)
BILIRUB SERPL-MCNC: 0.6 MG/DL (ref 0.1–1)
BUN SERPL-MCNC: 9 MG/DL (ref 6–20)
CALCIUM SERPL-MCNC: 8.6 MG/DL (ref 8.7–10.5)
CHLORIDE SERPL-SCNC: 103 MMOL/L (ref 95–110)
CO2 SERPL-SCNC: 25 MMOL/L (ref 23–29)
CREAT SERPL-MCNC: 0.6 MG/DL (ref 0.5–1.4)
DIFFERENTIAL METHOD: ABNORMAL
EOSINOPHIL # BLD AUTO: 0.1 K/UL (ref 0–0.5)
EOSINOPHIL NFR BLD: 1.6 % (ref 0–8)
ERYTHROCYTE [DISTWIDTH] IN BLOOD BY AUTOMATED COUNT: 13.4 % (ref 11.5–14.5)
EST. GFR  (AFRICAN AMERICAN): >60 ML/MIN/1.73 M^2
EST. GFR  (NON AFRICAN AMERICAN): >60 ML/MIN/1.73 M^2
GLUCOSE SERPL-MCNC: 100 MG/DL (ref 70–110)
HCT VFR BLD AUTO: 34.6 % (ref 37–48.5)
HGB BLD-MCNC: 11.3 G/DL (ref 12–16)
IMM GRANULOCYTES # BLD AUTO: 0.01 K/UL (ref 0–0.04)
IMM GRANULOCYTES NFR BLD AUTO: 0.2 % (ref 0–0.5)
INR PPP: 1.1
LYMPHOCYTES # BLD AUTO: 2.2 K/UL (ref 1–4.8)
LYMPHOCYTES NFR BLD: 38.7 % (ref 18–48)
MCH RBC QN AUTO: 27.4 PG (ref 27–31)
MCHC RBC AUTO-ENTMCNC: 32.7 G/DL (ref 32–36)
MCV RBC AUTO: 84 FL (ref 82–98)
MONOCYTES # BLD AUTO: 0.6 K/UL (ref 0.3–1)
MONOCYTES NFR BLD: 11.1 % (ref 4–15)
NEUTROPHILS # BLD AUTO: 2.7 K/UL (ref 1.8–7.7)
NEUTROPHILS NFR BLD: 47.9 % (ref 38–73)
NRBC BLD-RTO: 0 /100 WBC
PLATELET # BLD AUTO: 309 K/UL (ref 150–450)
PMV BLD AUTO: 10 FL (ref 9.2–12.9)
POTASSIUM SERPL-SCNC: 3.6 MMOL/L (ref 3.5–5.1)
PROT SERPL-MCNC: 7.2 G/DL (ref 6–8.4)
PROTHROMBIN TIME: 13.4 SEC (ref 11.8–14.3)
RBC # BLD AUTO: 4.12 M/UL (ref 4–5.4)
SODIUM SERPL-SCNC: 139 MMOL/L (ref 136–145)
WBC # BLD AUTO: 5.66 K/UL (ref 3.9–12.7)

## 2021-09-24 PROCEDURE — 85610 PROTHROMBIN TIME: CPT | Performed by: EMERGENCY MEDICINE

## 2021-09-24 PROCEDURE — 80053 COMPREHEN METABOLIC PANEL: CPT | Performed by: EMERGENCY MEDICINE

## 2021-09-24 PROCEDURE — 93005 ELECTROCARDIOGRAM TRACING: CPT | Performed by: INTERNAL MEDICINE

## 2021-09-24 PROCEDURE — 93010 EKG 12-LEAD: ICD-10-PCS | Mod: ,,, | Performed by: INTERNAL MEDICINE

## 2021-09-24 PROCEDURE — 85025 COMPLETE CBC W/AUTO DIFF WBC: CPT | Performed by: EMERGENCY MEDICINE

## 2021-09-24 PROCEDURE — 93010 ELECTROCARDIOGRAM REPORT: CPT | Mod: ,,, | Performed by: INTERNAL MEDICINE

## 2021-09-24 PROCEDURE — 25000003 PHARM REV CODE 250: Performed by: EMERGENCY MEDICINE

## 2021-09-24 PROCEDURE — 99284 EMERGENCY DEPT VISIT MOD MDM: CPT | Mod: 25

## 2021-09-24 RX ORDER — CLONIDINE HYDROCHLORIDE 0.1 MG/1
0.1 TABLET ORAL
Status: DISCONTINUED | OUTPATIENT
Start: 2021-09-24 | End: 2021-09-24

## 2021-09-24 RX ORDER — ALPRAZOLAM 0.5 MG/1
0.5 TABLET ORAL 3 TIMES DAILY
COMMUNITY

## 2021-09-24 RX ORDER — BUTALBITAL, ACETAMINOPHEN AND CAFFEINE 50; 325; 40 MG/1; MG/1; MG/1
1 TABLET ORAL EVERY 4 HOURS PRN
Qty: 12 TABLET | Refills: 0 | Status: SHIPPED | OUTPATIENT
Start: 2021-09-24 | End: 2021-10-24

## 2021-09-24 RX ORDER — BUTALBITAL, ACETAMINOPHEN AND CAFFEINE 50; 325; 40 MG/1; MG/1; MG/1
1 TABLET ORAL EVERY 4 HOURS PRN
Status: DISCONTINUED | OUTPATIENT
Start: 2021-09-24 | End: 2021-09-24 | Stop reason: HOSPADM

## 2021-09-24 RX ADMIN — BUTALBITAL, ACETAMINOPHEN AND CAFFEINE 1 TABLET: 50; 325; 40 TABLET ORAL at 12:09

## 2022-10-10 ENCOUNTER — HOSPITAL ENCOUNTER (EMERGENCY)
Facility: HOSPITAL | Age: 42
Discharge: HOME | End: 2022-10-10
Attending: EMERGENCY MEDICINE
Payer: MEDICARE

## 2022-10-10 VITALS
DIASTOLIC BLOOD PRESSURE: 82 MMHG | HEIGHT: 67 IN | WEIGHT: 248 LBS | BODY MASS INDEX: 38.92 KG/M2 | RESPIRATION RATE: 18 BRPM | SYSTOLIC BLOOD PRESSURE: 150 MMHG | OXYGEN SATURATION: 99 % | TEMPERATURE: 98 F | HEART RATE: 55 BPM

## 2022-10-10 DIAGNOSIS — T78.3XXA ANGIOEDEMA, INITIAL ENCOUNTER: Primary | ICD-10-CM

## 2022-10-10 LAB
ALBUMIN SERPL BCP-MCNC: 3.5 G/DL (ref 3.5–5.2)
ALP SERPL-CCNC: 66 U/L (ref 55–135)
ALT SERPL W/O P-5'-P-CCNC: 9 U/L (ref 10–44)
ANION GAP SERPL CALC-SCNC: 9 MMOL/L (ref 8–16)
AST SERPL-CCNC: 15 U/L (ref 10–40)
BASOPHILS # BLD AUTO: 0.03 K/UL (ref 0–0.2)
BASOPHILS NFR BLD: 0.3 % (ref 0–1.9)
BILIRUB SERPL-MCNC: 0.4 MG/DL (ref 0.1–1)
BUN SERPL-MCNC: 8 MG/DL (ref 6–20)
CALCIUM SERPL-MCNC: 8.9 MG/DL (ref 8.7–10.5)
CHLORIDE SERPL-SCNC: 103 MMOL/L (ref 95–110)
CO2 SERPL-SCNC: 25 MMOL/L (ref 23–29)
CREAT SERPL-MCNC: 0.7 MG/DL (ref 0.5–1.4)
DIFFERENTIAL METHOD: ABNORMAL
EOSINOPHIL # BLD AUTO: 0.1 K/UL (ref 0–0.5)
EOSINOPHIL NFR BLD: 0.9 % (ref 0–8)
ERYTHROCYTE [DISTWIDTH] IN BLOOD BY AUTOMATED COUNT: 18.4 % (ref 11.5–14.5)
EST. GFR  (NO RACE VARIABLE): >60 ML/MIN/1.73 M^2
GLUCOSE SERPL-MCNC: 249 MG/DL (ref 70–110)
HCT VFR BLD AUTO: 34.4 % (ref 37–48.5)
HGB BLD-MCNC: 10.6 G/DL (ref 12–16)
IMM GRANULOCYTES # BLD AUTO: 0.03 K/UL (ref 0–0.04)
IMM GRANULOCYTES NFR BLD AUTO: 0.3 % (ref 0–0.5)
LYMPHOCYTES # BLD AUTO: 5.1 K/UL (ref 1–4.8)
LYMPHOCYTES NFR BLD: 52.7 % (ref 18–48)
MCH RBC QN AUTO: 23.5 PG (ref 27–31)
MCHC RBC AUTO-ENTMCNC: 30.8 G/DL (ref 32–36)
MCV RBC AUTO: 76 FL (ref 82–98)
MONOCYTES # BLD AUTO: 0.6 K/UL (ref 0.3–1)
MONOCYTES NFR BLD: 6.5 % (ref 4–15)
NEUTROPHILS # BLD AUTO: 3.8 K/UL (ref 1.8–7.7)
NEUTROPHILS NFR BLD: 39.3 % (ref 38–73)
NRBC BLD-RTO: 0 /100 WBC
PLATELET # BLD AUTO: 354 K/UL (ref 150–450)
PMV BLD AUTO: 9.5 FL (ref 9.2–12.9)
POTASSIUM SERPL-SCNC: 2.8 MMOL/L (ref 3.5–5.1)
PROT SERPL-MCNC: 7 G/DL (ref 6–8.4)
RBC # BLD AUTO: 4.51 M/UL (ref 4–5.4)
SODIUM SERPL-SCNC: 137 MMOL/L (ref 136–145)
WBC # BLD AUTO: 9.67 K/UL (ref 3.9–12.7)

## 2022-10-10 PROCEDURE — 96372 THER/PROPH/DIAG INJ SC/IM: CPT | Performed by: EMERGENCY MEDICINE

## 2022-10-10 PROCEDURE — 99284 EMERGENCY DEPT VISIT MOD MDM: CPT | Mod: 25

## 2022-10-10 PROCEDURE — 85025 COMPLETE CBC W/AUTO DIFF WBC: CPT | Performed by: EMERGENCY MEDICINE

## 2022-10-10 PROCEDURE — 80053 COMPREHEN METABOLIC PANEL: CPT | Performed by: EMERGENCY MEDICINE

## 2022-10-10 PROCEDURE — 96374 THER/PROPH/DIAG INJ IV PUSH: CPT

## 2022-10-10 PROCEDURE — 63600175 PHARM REV CODE 636 W HCPCS: Performed by: EMERGENCY MEDICINE

## 2022-10-10 PROCEDURE — 25000003 PHARM REV CODE 250: Performed by: EMERGENCY MEDICINE

## 2022-10-10 PROCEDURE — 96375 TX/PRO/DX INJ NEW DRUG ADDON: CPT

## 2022-10-10 RX ORDER — PREDNISONE 20 MG/1
40 TABLET ORAL DAILY
Qty: 10 TABLET | Refills: 0 | Status: SHIPPED | OUTPATIENT
Start: 2022-10-10 | End: 2022-10-13

## 2022-10-10 RX ORDER — METHYLPREDNISOLONE SOD SUCC 125 MG
125 VIAL (EA) INJECTION
Status: COMPLETED | OUTPATIENT
Start: 2022-10-10 | End: 2022-10-10

## 2022-10-10 RX ORDER — FAMOTIDINE 10 MG/ML
20 INJECTION INTRAVENOUS
Status: COMPLETED | OUTPATIENT
Start: 2022-10-10 | End: 2022-10-10

## 2022-10-10 RX ORDER — EPINEPHRINE 0.3 MG/.3ML
0.3 INJECTION SUBCUTANEOUS
Status: COMPLETED | OUTPATIENT
Start: 2022-10-10 | End: 2022-10-10

## 2022-10-10 RX ORDER — DIPHENHYDRAMINE HYDROCHLORIDE 50 MG/ML
25 INJECTION INTRAMUSCULAR; INTRAVENOUS
Status: COMPLETED | OUTPATIENT
Start: 2022-10-10 | End: 2022-10-10

## 2022-10-10 RX ORDER — POTASSIUM CHLORIDE 20 MEQ/1
40 TABLET, EXTENDED RELEASE ORAL ONCE
Status: COMPLETED | OUTPATIENT
Start: 2022-10-10 | End: 2022-10-10

## 2022-10-10 RX ORDER — POTASSIUM CHLORIDE 750 MG/1
10 TABLET, EXTENDED RELEASE ORAL DAILY
Qty: 10 TABLET | Refills: 0 | Status: SHIPPED | OUTPATIENT
Start: 2022-10-10 | End: 2022-10-20

## 2022-10-10 RX ADMIN — POTASSIUM CHLORIDE 40 MEQ: 1500 TABLET, EXTENDED RELEASE ORAL at 02:10

## 2022-10-10 RX ADMIN — FAMOTIDINE 20 MG: 10 INJECTION, SOLUTION INTRAVENOUS at 11:10

## 2022-10-10 RX ADMIN — METHYLPREDNISOLONE SODIUM SUCCINATE 125 MG: 125 INJECTION, POWDER, FOR SOLUTION INTRAMUSCULAR; INTRAVENOUS at 11:10

## 2022-10-10 RX ADMIN — DIPHENHYDRAMINE HYDROCHLORIDE 25 MG: 50 INJECTION INTRAMUSCULAR; INTRAVENOUS at 11:10

## 2022-10-10 RX ADMIN — EPINEPHRINE 0.3 MG: 0.3 INJECTION SUBCUTANEOUS at 11:10

## 2022-10-10 NOTE — ED PROVIDER NOTES
Encounter Date: 10/10/2022       History     Chief Complaint   Patient presents with    Facial Swelling     Swelling of the lower lip. Pt states she has no known allergies and noticed the swelling this morning. States that it feels like the lip is tingling. States she only takes blood pressure medication.     Patient presents complaining of swelling to the lower lip.  Patient is on enalapril patient started having lower lip tingling and swelling this morning.  She denies any difficulty breathing or swallowing.  She has not had this before.  Nothing really makes it better worse.    Review of patient's allergies indicates:  No Known Allergies  Past Medical History:   Diagnosis Date    Abnormal Pap smear of cervix     Cryosurgery    Hypertension     Pseudotumor cerebri      Past Surgical History:   Procedure Laterality Date    CHOLECYSTECTOMY      GYNECOLOGIC CRYOSURGERY       Family History   Problem Relation Age of Onset    Colon cancer Father     Hypertension Mother     Heart disease Mother      Social History     Tobacco Use    Smoking status: Never    Smokeless tobacco: Never   Substance Use Topics    Alcohol use: No    Drug use: No     Review of Systems   All other systems reviewed and are negative.    Physical Exam     Initial Vitals [10/10/22 1031]   BP Pulse Resp Temp SpO2   (!) 162/83 65 18 98.5 °F (36.9 °C) 100 %      MAP       --         Physical Exam    Nursing note and vitals reviewed.  Constitutional: She appears well-developed and well-nourished.   Pleasant, polite   HENT:   Head: Normocephalic and atraumatic.   The left lower lip is edematous mostly on the left side.  The posterior pharynx is clear.  The neck is without edema.  There is no swelling under the tongue.   Eyes: EOM are normal.   Neck: Neck supple.   Normal range of motion.  Pulmonary/Chest: No respiratory distress.   Musculoskeletal:      Cervical back: Normal range of motion and neck supple.     Neurological: She is alert and oriented to  person, place, and time.   Skin: Skin is warm and dry. Capillary refill takes less than 2 seconds.   Psychiatric: She has a normal mood and affect. Her behavior is normal. Judgment and thought content normal.       ED Course   Procedures  Labs Reviewed   CBC W/ AUTO DIFFERENTIAL - Abnormal; Notable for the following components:       Result Value    Hemoglobin 10.6 (*)     Hematocrit 34.4 (*)     MCV 76 (*)     MCH 23.5 (*)     MCHC 30.8 (*)     RDW 18.4 (*)     Lymph # 5.1 (*)     Lymph % 52.7 (*)     All other components within normal limits   COMPREHENSIVE METABOLIC PANEL - Abnormal; Notable for the following components:    Potassium 2.8 (*)     Glucose 249 (*)     ALT 9 (*)     All other components within normal limits    Narrative:     Potassium critical result(s) called and verbal readback obtained from   Leslie Martins RN  by HS3 10/10/2022 13:51          Imaging Results    None          Medications   methylPREDNISolone sodium succinate injection 125 mg (125 mg Intravenous Given 10/10/22 1112)   famotidine (PF) injection 20 mg (20 mg Intravenous Given 10/10/22 1111)   diphenhydrAMINE injection 25 mg (25 mg Intravenous Given 10/10/22 1111)   EPINEPHrine (EPIPEN) 0.3 mg/0.3 mL pen injection 0.3 mg (0.3 mg Intramuscular Given 10/10/22 1111)   potassium chloride SA CR tablet 40 mEq (40 mEq Oral Given 10/10/22 1419)     Medical Decision Making:   Initial Assessment:   Patient is in no distress  Differential Diagnosis:   Considerations include but are not limited to angioedema, allergic reaction  Clinical Tests:   Lab Tests: Reviewed and Ordered  ED Management:  In the emergency department patient does have low potassium which was replaced.  She did receive Solu-Medrol, Pepcid, Benadryl, epinephrine.  Patient was observed in the emergency department for nearly 6 hours.  Patient did have improvement of the lower lip swelling.  She never had any progression to the posterior pharynx or airway involvement.  She was  prescribed prednisone from emergency department for the next 3 days.  Her blood sugar was mildly elevated however she did receive Solu-Medrol in the emergency department and blood work was checked after Solu-Medrol.  Patient was advised to stop enalapril.  She was advised to call her primary care doctor Dr. Renee tomorrow for reassessment of blood pressure medication.  She was given potassium replacement prescription for the next 10 days.  Patient be discharged stable condition.  Detailed return precautions discussed    Attending Critical Care:   Critical Care Times:   Direct Patient Care (initial evaluation, reassessments, and time considering the case)................................................................10 minutes.   Additional History from reviewing old medical records or taking additional history from the family, EMS, PCP, etc.......................10 minutes.   Ordering, Reviewing, and Interpreting Diagnostic Studies...............................................................................................................10 minutes.   Documentation..................................................................................................................................................................................5 minutes.   ==============================================================  · Total Critical Care Time - exclusive of procedural time: 35 minutes.  ==============================================================  Critical care was necessary to treat or prevent imminent or life-threatening deterioration of the following conditions:  Angioedema.   Critical care was time spent personally by me on the following activities: obtaining history from patient or relative, examination of patient, review of x-rays / CT sent with the patient, ordering lab, x-rays, and/or EKG, development of treatment plan with patient or relative, ordering and performing treatments and interventions,  interpretation of cardiac measurements and re-evaluation of patient's condition.   Critical Care Condition: potentially life-threatening                           Clinical Impression:   Final diagnoses:  [T78.3XXA] Angioedema, initial encounter (Primary)        ED Disposition Condition    Discharge Stable          ED Prescriptions       Medication Sig Dispense Start Date End Date Auth. Provider    potassium chloride (KLOR-CON) 10 MEQ TbSR Take 1 tablet (10 mEq total) by mouth once daily. for 10 days 10 tablet 10/10/2022 10/20/2022 Eiml Reyes MD    predniSONE (DELTASONE) 20 MG tablet Take 2 tablets (40 mg total) by mouth once daily. for 3 days 10 tablet 10/10/2022 10/13/2022 Emil Reyes MD          Follow-up Information       Follow up With Specialties Details Why Contact Info    Zeferino Renee MD Internal Medicine In 1 week  [address] [place] [address]  [address] [place] [id number]  [phone]               Emil Reyes MD  10/10/22 6453

## 2022-10-10 NOTE — ED NOTES
Reports woke up this am with left side of bottom lip swollen. Denies any shortness of breath or pain.

## 2024-01-17 ENCOUNTER — HOSPITAL ENCOUNTER (OUTPATIENT)
Dept: RADIOLOGY | Facility: HOSPITAL | Age: 44
Discharge: HOME | End: 2024-01-17
Attending: EMERGENCY MEDICINE
Payer: MEDICARE

## 2024-01-17 DIAGNOSIS — I10 ESSENTIAL HYPERTENSION, MALIGNANT: Primary | ICD-10-CM

## 2024-01-17 DIAGNOSIS — R73.03 DIABETES MELLITUS, LATENT: ICD-10-CM

## 2024-01-17 DIAGNOSIS — D64.9 ANEMIA, UNSPECIFIED: ICD-10-CM

## 2024-01-17 DIAGNOSIS — R22.2 LOCALIZED SWELLING, MASS AND LUMP, TRUNK: Primary | ICD-10-CM

## 2024-01-17 DIAGNOSIS — R22.2 LOCALIZED SWELLING, MASS AND LUMP, TRUNK: ICD-10-CM

## 2024-01-17 PROCEDURE — 71046 X-RAY EXAM CHEST 2 VIEWS: CPT | Mod: TC,PO

## 2024-01-19 DIAGNOSIS — G93.2 BENIGN INTRACRANIAL HYPERTENSION: ICD-10-CM

## 2024-01-19 DIAGNOSIS — G43.109 MIGRAINE WITH AURA, NOT INTRACTABLE, WITHOUT STATUS MIGRAINOSUS: Primary | ICD-10-CM

## 2024-01-24 ENCOUNTER — LAB VISIT (OUTPATIENT)
Dept: LAB | Facility: HOSPITAL | Age: 44
End: 2024-01-24
Attending: EMERGENCY MEDICINE
Payer: MEDICARE

## 2024-01-24 DIAGNOSIS — R73.03 DIABETES MELLITUS, LATENT: ICD-10-CM

## 2024-01-24 DIAGNOSIS — I10 ESSENTIAL HYPERTENSION, MALIGNANT: ICD-10-CM

## 2024-01-24 DIAGNOSIS — D64.9 ANEMIA, UNSPECIFIED: ICD-10-CM

## 2024-01-24 LAB
ALBUMIN SERPL BCP-MCNC: 4 G/DL (ref 3.5–5.2)
ALP SERPL-CCNC: 72 U/L (ref 55–135)
ALT SERPL W/O P-5'-P-CCNC: 11 U/L (ref 10–44)
ANION GAP SERPL CALC-SCNC: 7 MMOL/L (ref 8–16)
AST SERPL-CCNC: 16 U/L (ref 10–40)
BASOPHILS # BLD AUTO: 0.03 K/UL (ref 0–0.2)
BASOPHILS NFR BLD: 0.6 % (ref 0–1.9)
BILIRUB SERPL-MCNC: 0.9 MG/DL (ref 0.1–1)
BUN SERPL-MCNC: 10 MG/DL (ref 6–20)
CALCIUM SERPL-MCNC: 8.8 MG/DL (ref 8.7–10.5)
CHLORIDE SERPL-SCNC: 102 MMOL/L (ref 95–110)
CHOLEST SERPL-MCNC: 193 MG/DL (ref 120–199)
CHOLEST/HDLC SERPL: 3.4 {RATIO} (ref 2–5)
CO2 SERPL-SCNC: 28 MMOL/L (ref 23–29)
CREAT SERPL-MCNC: 0.8 MG/DL (ref 0.5–1.4)
DIFFERENTIAL METHOD BLD: ABNORMAL
EOSINOPHIL # BLD AUTO: 0.1 K/UL (ref 0–0.5)
EOSINOPHIL NFR BLD: 1.5 % (ref 0–8)
ERYTHROCYTE [DISTWIDTH] IN BLOOD BY AUTOMATED COUNT: 12.2 % (ref 11.5–14.5)
EST. GFR  (NO RACE VARIABLE): >60 ML/MIN/1.73 M^2
GLUCOSE SERPL-MCNC: 151 MG/DL (ref 70–110)
HCT VFR BLD AUTO: 40.2 % (ref 37–48.5)
HDLC SERPL-MCNC: 56 MG/DL (ref 40–75)
HDLC SERPL: 29 % (ref 20–50)
HGB BLD-MCNC: 13.6 G/DL (ref 12–16)
IMM GRANULOCYTES # BLD AUTO: 0.01 K/UL (ref 0–0.04)
IMM GRANULOCYTES NFR BLD AUTO: 0.2 % (ref 0–0.5)
IRON SERPL-MCNC: 99 UG/DL (ref 30–160)
LDLC SERPL CALC-MCNC: 110.6 MG/DL (ref 63–159)
LYMPHOCYTES # BLD AUTO: 2.8 K/UL (ref 1–4.8)
LYMPHOCYTES NFR BLD: 51.8 % (ref 18–48)
MCH RBC QN AUTO: 28.4 PG (ref 27–31)
MCHC RBC AUTO-ENTMCNC: 33.8 G/DL (ref 32–36)
MCV RBC AUTO: 84 FL (ref 82–98)
MONOCYTES # BLD AUTO: 0.3 K/UL (ref 0.3–1)
MONOCYTES NFR BLD: 5.6 % (ref 4–15)
NEUTROPHILS # BLD AUTO: 2.1 K/UL (ref 1.8–7.7)
NEUTROPHILS NFR BLD: 40.3 % (ref 38–73)
NONHDLC SERPL-MCNC: 137 MG/DL
NRBC BLD-RTO: 0 /100 WBC
PLATELET # BLD AUTO: 315 K/UL (ref 150–450)
PMV BLD AUTO: 10.1 FL (ref 9.2–12.9)
POTASSIUM SERPL-SCNC: 3.7 MMOL/L (ref 3.5–5.1)
PROT SERPL-MCNC: 7.1 G/DL (ref 6–8.4)
RBC # BLD AUTO: 4.79 M/UL (ref 4–5.4)
SODIUM SERPL-SCNC: 137 MMOL/L (ref 136–145)
TRIGL SERPL-MCNC: 132 MG/DL (ref 30–150)
TSH SERPL DL<=0.005 MIU/L-ACNC: 1.58 UIU/ML (ref 0.34–5.6)
WBC # BLD AUTO: 5.31 K/UL (ref 3.9–12.7)

## 2024-01-24 PROCEDURE — 83002 ASSAY OF GONADOTROPIN (LH): CPT | Performed by: EMERGENCY MEDICINE

## 2024-01-24 PROCEDURE — 36415 COLL VENOUS BLD VENIPUNCTURE: CPT | Performed by: EMERGENCY MEDICINE

## 2024-01-24 PROCEDURE — 83036 HEMOGLOBIN GLYCOSYLATED A1C: CPT | Performed by: EMERGENCY MEDICINE

## 2024-01-24 PROCEDURE — 80061 LIPID PANEL: CPT | Performed by: EMERGENCY MEDICINE

## 2024-01-24 PROCEDURE — 84443 ASSAY THYROID STIM HORMONE: CPT | Performed by: EMERGENCY MEDICINE

## 2024-01-24 PROCEDURE — 83540 ASSAY OF IRON: CPT | Performed by: EMERGENCY MEDICINE

## 2024-01-24 PROCEDURE — 80053 COMPREHEN METABOLIC PANEL: CPT | Performed by: EMERGENCY MEDICINE

## 2024-01-24 PROCEDURE — 85025 COMPLETE CBC W/AUTO DIFF WBC: CPT | Performed by: EMERGENCY MEDICINE

## 2024-01-24 PROCEDURE — 83001 ASSAY OF GONADOTROPIN (FSH): CPT | Performed by: EMERGENCY MEDICINE

## 2024-01-25 LAB
ESTIMATED AVG GLUCOSE: 180 MG/DL (ref 68–131)
HBA1C MFR BLD: 7.9 % (ref 4.5–6.2)

## 2024-01-26 LAB
FSH SERPL-ACNC: 15.9 MIU/ML
LH SERPL-ACNC: 14.9 MIU/ML

## 2024-01-31 ENCOUNTER — HOSPITAL ENCOUNTER (OUTPATIENT)
Dept: RADIOLOGY | Facility: HOSPITAL | Age: 44
Discharge: HOME | End: 2024-01-31
Attending: EMERGENCY MEDICINE
Payer: MEDICARE

## 2024-01-31 DIAGNOSIS — G43.109 MIGRAINE WITH AURA, NOT INTRACTABLE, WITHOUT STATUS MIGRAINOSUS: ICD-10-CM

## 2024-01-31 DIAGNOSIS — G93.2 BENIGN INTRACRANIAL HYPERTENSION: ICD-10-CM

## 2024-01-31 PROCEDURE — 70551 MRI BRAIN STEM W/O DYE: CPT | Mod: TC,PO

## 2025-06-13 ENCOUNTER — HOSPITAL ENCOUNTER (EMERGENCY)
Facility: HOSPITAL | Age: 45
Discharge: HOME | End: 2025-06-14
Attending: STUDENT IN AN ORGANIZED HEALTH CARE EDUCATION/TRAINING PROGRAM
Payer: MEDICARE

## 2025-06-13 DIAGNOSIS — M54.50 ACUTE LEFT-SIDED LOW BACK PAIN WITHOUT SCIATICA: Primary | ICD-10-CM

## 2025-06-13 DIAGNOSIS — S39.012A LUMBAR STRAIN, INITIAL ENCOUNTER: ICD-10-CM

## 2025-06-13 PROCEDURE — 96372 THER/PROPH/DIAG INJ SC/IM: CPT

## 2025-06-13 PROCEDURE — 63600175 PHARM REV CODE 636 W HCPCS: Mod: TB,JZ

## 2025-06-13 PROCEDURE — 25000003 PHARM REV CODE 250

## 2025-06-13 PROCEDURE — 99284 EMERGENCY DEPT VISIT MOD MDM: CPT | Mod: 25

## 2025-06-13 RX ORDER — KETOROLAC TROMETHAMINE 30 MG/ML
15 INJECTION, SOLUTION INTRAMUSCULAR; INTRAVENOUS
Status: COMPLETED | OUTPATIENT
Start: 2025-06-14 | End: 2025-06-13

## 2025-06-13 RX ORDER — ACETAMINOPHEN 325 MG/1
650 TABLET ORAL
Status: COMPLETED | OUTPATIENT
Start: 2025-06-14 | End: 2025-06-13

## 2025-06-13 RX ORDER — LIDOCAINE 50 MG/G
1 PATCH TOPICAL
Status: DISCONTINUED | OUTPATIENT
Start: 2025-06-14 | End: 2025-06-14 | Stop reason: HOSPADM

## 2025-06-13 RX ORDER — METHOCARBAMOL 500 MG/1
500 TABLET, FILM COATED ORAL
Status: COMPLETED | OUTPATIENT
Start: 2025-06-14 | End: 2025-06-13

## 2025-06-13 RX ADMIN — ACETAMINOPHEN 650 MG: 325 TABLET ORAL at 11:06

## 2025-06-13 RX ADMIN — KETOROLAC TROMETHAMINE 15 MG: 30 INJECTION, SOLUTION INTRAMUSCULAR; INTRAVENOUS at 11:06

## 2025-06-13 RX ADMIN — LIDOCAINE 1 PATCH: 50 PATCH TOPICAL at 11:06

## 2025-06-13 RX ADMIN — METHOCARBAMOL 500 MG: 500 TABLET ORAL at 11:06

## 2025-06-14 VITALS
HEIGHT: 67 IN | DIASTOLIC BLOOD PRESSURE: 75 MMHG | RESPIRATION RATE: 18 BRPM | WEIGHT: 245 LBS | TEMPERATURE: 99 F | SYSTOLIC BLOOD PRESSURE: 136 MMHG | HEART RATE: 65 BPM | BODY MASS INDEX: 38.45 KG/M2 | OXYGEN SATURATION: 98 %

## 2025-06-14 LAB
B-HCG UR QL: NEGATIVE
CTP QC/QA: YES

## 2025-06-14 PROCEDURE — 81025 URINE PREGNANCY TEST: CPT | Performed by: STUDENT IN AN ORGANIZED HEALTH CARE EDUCATION/TRAINING PROGRAM

## 2025-06-14 RX ORDER — LIDOCAINE 50 MG/G
1 PATCH TOPICAL DAILY
Qty: 30 PATCH | Refills: 0 | Status: SHIPPED | OUTPATIENT
Start: 2025-06-14

## 2025-06-14 RX ORDER — METHOCARBAMOL 500 MG/1
500 TABLET, FILM COATED ORAL 3 TIMES DAILY
Qty: 15 TABLET | Refills: 0 | Status: SHIPPED | OUTPATIENT
Start: 2025-06-14 | End: 2025-06-19

## 2025-06-14 NOTE — DISCHARGE INSTRUCTIONS
For pain please try 1000 mg of Tylenol and 400 mg of ibuprofen together every 6 hours for up to 2 weeks.  Do not use for longer than 2 weeks without discussing with the primary care doctor.  Do not take anything else that has Tylenol also called acetaminophen in it as overdosing on Tylenol can kill you.  The maximum amount of Tylenol that you can have per day is 4000 mg from all sources.    You can use 1-2 lidocaine patches for 12 hours but then for the next 12 hours you must be patch free.      You can use these medications (the Tylenol, Ibuprofen, and lidocaine patches) for 1-2 weeks but do not use for longer without speaking to your primary care physician.       The Robaxin can make you drowsy, do not take it before driving or performing dangerous activities    Please note that many medications that are over the counter and prescribed have Tylenol in them. Another name for Tylenol is acetaminophen. For example a 5-325 Norco tablet has 5mg hydrocodone and 325 mg of Tylenol in each tablet. Do not take more than a total of 3000 mg of Tylenol per day from all medications as this can make you seriously ill and kill you.    When your pain has eased with the medications make sure you do some general stretching and strengthening exercises for your core to prevent your back pain from becoming chronic. Chronic back pain is much harder to treat.     We placed an order for physical medicine and rehabilitation.  They should be contacting you regarding scheduling an appointment

## 2025-06-14 NOTE — ED PROVIDER NOTES
Encounter Date: 6/13/2025       History     Chief Complaint   Patient presents with    Back Pain     Patient with left back pain, states she bent over while cooking, went to stand up and felt a pain in her left side back, denies, chest pain, nausea, sob, or any other complaints     HPI    44-year-old female, past medical history of hypertension, anxiety, presents to the emergency department today with chief complaint of left lower back pain.  She reports that she has been experiencing some pain in this area over the past week, however when she bent over while cooking approximately 4 hours ago, she noticed sudden onset of terrible 10/10 pain that she describes as sharp, stabbing.  It does not radiate, she denies weakness in her legs, saddle anesthesia, incontinence.  Denies fever, nausea, vomiting.  She took an old muscle relaxer pill immediately after onset that took the edge off the pain.    Review of patient's allergies indicates:  No Known Allergies  Past Medical History:   Diagnosis Date    Abnormal Pap smear of cervix     Cryosurgery    Hypertension     Pseudotumor cerebri      Past Surgical History:   Procedure Laterality Date    CHOLECYSTECTOMY      GYNECOLOGIC CRYOSURGERY       Family History   Problem Relation Name Age of Onset    Colon cancer Father      Hypertension Mother      Heart disease Mother       Social History[1]  Review of Systems  See HPI  Physical Exam     Initial Vitals [06/13/25 2235]   BP Pulse Resp Temp SpO2   134/76 69 18 98.9 °F (37.2 °C) 100 %      MAP       --         Physical Exam    Nursing note and vitals reviewed.  Constitutional: She appears well-developed and well-nourished. She is not diaphoretic. No distress.   HENT:   Head: Normocephalic and atraumatic.   Neck: Neck supple.   Normal range of motion.  Cardiovascular:  Normal rate, regular rhythm, normal heart sounds and intact distal pulses.     Exam reveals no gallop and no friction rub.       No murmur  heard.  Pulmonary/Chest: Breath sounds normal. No respiratory distress. She has no wheezes. She has no rhonchi. She has no rales.   Abdominal: Abdomen is soft. Bowel sounds are normal. She exhibits no distension. There is no abdominal tenderness. There is no rebound and no guarding.   Musculoskeletal:         General: Tenderness present.      Cervical back: Normal range of motion and neck supple.      Comments: Tenderness to palpation over the left paraspinal lumbar musculature extending out towards the iliac crest.  No midline spinal tenderness.  No tenderness in the right side of the spine.  No SI joint tenderness to palpation.  No tenderness to palpation over the hip joint.  Negative straight leg raise bilaterally.  She does have pain with flexion and extension of her spine     Neurological: She is alert and oriented to person, place, and time. She has normal strength. No sensory deficit. GCS score is 15. GCS eye subscore is 4. GCS verbal subscore is 5. GCS motor subscore is 6.   Skin: Skin is warm and dry. No rash noted.   Psychiatric: She has a normal mood and affect. Thought content normal.         ED Course   Procedures  Labs Reviewed   POCT URINE PREGNANCY       Result Value    POC Preg Test, Ur Negative       Acceptable Yes            Imaging Results    None          Medications   LIDOcaine 5 % patch 1 patch (1 patch Transdermal Patch Applied 6/13/25 2355)   methocarbamoL tablet 500 mg (500 mg Oral Given 6/13/25 2354)   ketorolac injection 15 mg (15 mg Intramuscular Given 6/13/25 2354)   acetaminophen tablet 650 mg (650 mg Oral Given 6/13/25 2355)     Medical Decision Making  44-year-old female with history of hypertension, anxiety, presents with left lower back pain.  The pain started approximately a week ago and was mild, however it worsened significantly today after leaning over the counter.  No numbness, weakness, saddle anesthesia, or incontinence.    Vital signs here notable for mild  hypertension, otherwise stable.  Physical exam otherwise as detailed above.    Overall, presentation concerning for musculoskeletal strain versus slipped disc.  Low concern for cauda equina syndrome, epidural abscess, spinal fracture given lack of trauma, fever, and description of onset.     We will give multimodal pain medication, reassess.     Bonifacio May MD  [facility]     Update:  Patient reassessed, reports her pain has improved, she will be given a prescription for Robaxin, as well as lidocaine patches.  She was referred to physical medicine rehabilitation.  Her presentation is most consistent with lumbosacral strain, she is going to return precautions, instructions to follow up, instructions for symptomatic management.  She expressed understanding, agreed with plan for discharge.     Bonifacio May MD  [facility]     Amount and/or Complexity of Data Reviewed  Labs: ordered. Decision-making details documented in ED Course.    Risk  OTC drugs.  Prescription drug management.               ED Course as of 06/14/25 0200   Sat Jun 14, 2025   0147 hCG Qualitative, Urine: Negative  Not pregnant [AH]      ED Course User Index  [AH] Bonifacio May MD                           Clinical Impression:  Final diagnoses:  [M54.50] Acute left-sided low back pain without sciatica (Primary)  [S39.012A] Lumbar strain, initial encounter          ED Disposition Condition    Discharge Stable          ED Prescriptions       Medication Sig Dispense Start Date End Date Auth. Provider    LIDOcaine (LIDODERM) 5 % Place 1 patch onto the skin once daily. Remove & Discard patch within 12 hours or as directed by MD 30 patch 6/14/2025 -- Bonifacio May MD    methocarbamoL (ROBAXIN) 500 MG Tab Take 1 tablet (500 mg total) by mouth 3 (three) times daily. for 5 days 15 tablet 6/14/2025 6/19/2025 Bonifacio May MD          Follow-up Information       Follow up With Specialties Details Why Contact Info  Additional Information    Sai Meyers MD Physical Medicine and Rehabilitation Schedule an appointment as soon as possible for a visit   [address] [place] [address] [address], [address]  [facility] [address]  [phone]       [facility] - Emergency Dept Emergency Medicine Go to  If symptoms worsen, As needed [address] [facility] [id number] [phone] 1st floor                   [1]   Social History  Tobacco Use    Smoking status: Never    Smokeless tobacco: Never   Substance Use Topics    Alcohol use: No    Drug use: No        Bonifacio May MD  Resident  06/14/25 0200

## 2025-06-24 DIAGNOSIS — M54.50 LUMBAGO: Primary | ICD-10-CM

## 2025-07-01 ENCOUNTER — HOSPITAL ENCOUNTER (OUTPATIENT)
Dept: RADIOLOGY | Facility: HOSPITAL | Age: 45
Discharge: HOME | End: 2025-07-01
Attending: EMERGENCY MEDICINE
Payer: MEDICARE

## 2025-07-01 DIAGNOSIS — M54.50 LUMBAGO: ICD-10-CM

## 2025-07-01 LAB
CREAT SERPL-MCNC: 0.7 MG/DL (ref 0.5–1.4)
SAMPLE: NORMAL

## 2025-07-01 PROCEDURE — 25500020 PHARM REV CODE 255: Mod: PO | Performed by: EMERGENCY MEDICINE

## 2025-07-01 PROCEDURE — A9585 GADOBUTROL INJECTION: HCPCS | Mod: PO | Performed by: EMERGENCY MEDICINE

## 2025-07-01 PROCEDURE — 72157 MRI CHEST SPINE W/O & W/DYE: CPT | Mod: 26,,, | Performed by: RADIOLOGY

## 2025-07-01 PROCEDURE — 82565 ASSAY OF CREATININE: CPT | Mod: PO

## 2025-07-01 RX ORDER — GADOBUTROL 604.72 MG/ML
11.5 INJECTION INTRAVENOUS
Status: COMPLETED | OUTPATIENT
Start: 2025-07-01 | End: 2025-07-01

## 2025-07-01 RX ADMIN — GADOBUTROL 11.5 ML: 604.72 INJECTION INTRAVENOUS at 09:07
